# Patient Record
Sex: FEMALE | Race: BLACK OR AFRICAN AMERICAN
[De-identification: names, ages, dates, MRNs, and addresses within clinical notes are randomized per-mention and may not be internally consistent; named-entity substitution may affect disease eponyms.]

---

## 2016-07-30 VITALS
DIASTOLIC BLOOD PRESSURE: 73 MMHG | SYSTOLIC BLOOD PRESSURE: 148 MMHG | SYSTOLIC BLOOD PRESSURE: 148 MMHG | DIASTOLIC BLOOD PRESSURE: 73 MMHG

## 2019-08-06 ENCOUNTER — HOSPITAL ENCOUNTER (OUTPATIENT)
Dept: HOSPITAL 61 - MAMMO | Age: 64
Discharge: HOME | End: 2019-08-06
Attending: FAMILY MEDICINE
Payer: COMMERCIAL

## 2019-08-06 DIAGNOSIS — N63.20: ICD-10-CM

## 2019-08-06 DIAGNOSIS — Z12.31: Primary | ICD-10-CM

## 2019-08-06 DIAGNOSIS — N63.10: ICD-10-CM

## 2019-08-06 PROCEDURE — 77067 SCR MAMMO BI INCL CAD: CPT

## 2019-08-12 NOTE — RAD
DATE: 8/6/2019 1:58 PM



EXAM: DIGITAL SCREEN BILAT W/CAD



HISTORY:  routine screening evaluation.



COMPARISON: None available



Bilateral full field craniocaudal and mediolateral oblique images as well as

exaggerated CC images were obtained using digital technique.



This study was interpreted with the benefit of Computerized Aided Detection

(CAD).



Breast Density: FATTY  The Breast Parenchyma is primarily fatty replaced.

Breast parenchyma level density A.



FINDINGS:

Benign calcifications are present.  Multiple bilateral well-circumscribed

round breast masses are seen laterally.

 

Otherwise, no microcalcifications or architectural distortion is present.



The visualized axillae are unremarkable. 



IMPRESSION: Bilateral well-circumscribed breast masses, findings for which

additional imaging is advised. 



BI-RADS CATEGORY: 0 INCOMPLETE: NEEDS ADDITIONAL IMAGING EVALUATION AND/OR

PRIOR MAMMOGRAMS FOR COMPARISON.



RECOMMENDED FOLLOW-UP: ADD ADDITIONAL IMAGING The patient will be contacted to

submitted her prior imaging, performed at an outside institution, for the

purposes of comparison. If the patient is unable to submit the images in a

timely manner (1-2 weeks), then the recommendation would be for additional

imaging to be performed.



PQRS compliance statement: Patient information was entered into a reminder

system with a target due date for the next mammogram.



Mammography is a sensitive method for finding small breast cancers, but it

does not detect them all and is not a substitute for careful clinical

examination.  A negative mammogram does not negate a clinically suspicious

finding and should not result in delay in biopsying a clinically suspicious

abnormality.



"Our facility is accredited by the [employer] Mammography

Program."

## 2019-12-10 ENCOUNTER — HOSPITAL ENCOUNTER (OUTPATIENT)
Dept: HOSPITAL 61 - MAMMO | Age: 64
Discharge: HOME | End: 2019-12-10
Attending: NURSE PRACTITIONER
Payer: COMMERCIAL

## 2019-12-10 DIAGNOSIS — N63.11: ICD-10-CM

## 2019-12-10 DIAGNOSIS — R92.2: Primary | ICD-10-CM

## 2019-12-10 DIAGNOSIS — N63.21: ICD-10-CM

## 2019-12-10 PROCEDURE — 77066 DX MAMMO INCL CAD BI: CPT

## 2019-12-10 NOTE — RAD
DATE: 12/10/2019



EXAM: DIGITAL DIAGNOSTIC BILATERAL



HISTORY: Abnormal mammogram



COMPARISON: 8/6/2019 mammogram



This study was interpreted with the benefit of Computerized Aided Detection

(CAD).





Breast Density: SCATTERED The breast parenchyma shows scattered fibroglandular

densities. Breast parenchyma level B.





FINDINGS: Persistence of masses on spot compression is noted at the upper

outer aspect of the right and left breast. Fatty hilum is suggested as

respectively lymph nodes.  





IMPRESSION: Persistence of masses. These may represent intramammary lymph

nodes.







BI-RADS CATEGORY: 3 PROBABLY BENIGN FINDING(S)-SHORT INTERVAL FOLLOW-UP

SUGGESTED



RECOMMENDED FOLLOW-UP: 12M 12 MONTH FOLLOW-UP. Follow-up at the time of annual

mammographic evaluation is recommended to assess stability. Ultrasound may be

needed at that time.



PQRS compliance statement: Patient information was entered into a reminder

system with a target due date for the next mammogram.



Mammography is a sensitive method for finding small breast cancers, but it

does not detect them all and is not a substitute for careful clinical

examination.  A negative mammogram does not negate a clinically suspicious

finding and should not result in delay in biopsying a clinically suspicious

abnormality.



"Our facility is accredited by the [employer] Mammography

Program."

## 2021-07-01 ENCOUNTER — HOSPITAL ENCOUNTER (EMERGENCY)
Dept: HOSPITAL 61 - ER | Age: 66
LOS: 1 days | Discharge: HOME | End: 2021-07-02
Payer: MEDICARE

## 2021-07-01 VITALS — BODY MASS INDEX: 44.41 KG/M2 | HEIGHT: 68 IN | WEIGHT: 293 LBS

## 2021-07-01 DIAGNOSIS — J44.9: ICD-10-CM

## 2021-07-01 DIAGNOSIS — Z90.710: ICD-10-CM

## 2021-07-01 DIAGNOSIS — I13.0: ICD-10-CM

## 2021-07-01 DIAGNOSIS — Z95.0: ICD-10-CM

## 2021-07-01 DIAGNOSIS — N39.0: Primary | ICD-10-CM

## 2021-07-01 DIAGNOSIS — J06.9: ICD-10-CM

## 2021-07-01 DIAGNOSIS — N18.9: ICD-10-CM

## 2021-07-01 DIAGNOSIS — E11.22: ICD-10-CM

## 2021-07-01 DIAGNOSIS — I50.9: ICD-10-CM

## 2021-07-01 DIAGNOSIS — Z88.8: ICD-10-CM

## 2021-07-01 PROCEDURE — 85025 COMPLETE CBC W/AUTO DIFF WBC: CPT

## 2021-07-01 PROCEDURE — 71045 X-RAY EXAM CHEST 1 VIEW: CPT

## 2021-07-01 PROCEDURE — 99285 EMERGENCY DEPT VISIT HI MDM: CPT

## 2021-07-01 PROCEDURE — 83880 ASSAY OF NATRIURETIC PEPTIDE: CPT

## 2021-07-01 PROCEDURE — 96375 TX/PRO/DX INJ NEW DRUG ADDON: CPT

## 2021-07-01 PROCEDURE — 87086 URINE CULTURE/COLONY COUNT: CPT

## 2021-07-01 PROCEDURE — 96374 THER/PROPH/DIAG INJ IV PUSH: CPT

## 2021-07-01 PROCEDURE — 84484 ASSAY OF TROPONIN QUANT: CPT

## 2021-07-01 PROCEDURE — 83690 ASSAY OF LIPASE: CPT

## 2021-07-01 PROCEDURE — 36415 COLL VENOUS BLD VENIPUNCTURE: CPT

## 2021-07-01 PROCEDURE — 93005 ELECTROCARDIOGRAM TRACING: CPT

## 2021-07-01 PROCEDURE — 81001 URINALYSIS AUTO W/SCOPE: CPT

## 2021-07-01 PROCEDURE — 87077 CULTURE AEROBIC IDENTIFY: CPT

## 2021-07-01 PROCEDURE — 83735 ASSAY OF MAGNESIUM: CPT

## 2021-07-01 PROCEDURE — 80053 COMPREHEN METABOLIC PANEL: CPT

## 2021-07-02 VITALS — DIASTOLIC BLOOD PRESSURE: 79 MMHG | SYSTOLIC BLOOD PRESSURE: 169 MMHG

## 2021-07-02 LAB
ALBUMIN SERPL-MCNC: 2.7 G/DL (ref 3.4–5)
ALBUMIN/GLOB SERPL: 0.8 {RATIO} (ref 1–1.7)
ALP SERPL-CCNC: 76 U/L (ref 46–116)
ALT SERPL-CCNC: 17 U/L (ref 14–59)
ANION GAP SERPL CALC-SCNC: 8 MMOL/L (ref 6–14)
APTT PPP: YELLOW S
AST SERPL-CCNC: 13 U/L (ref 15–37)
BACTERIA #/AREA URNS HPF: (no result) /HPF
BASOPHILS # BLD AUTO: 0.1 X10^3/UL (ref 0–0.2)
BASOPHILS NFR BLD: 1 % (ref 0–3)
BILIRUB SERPL-MCNC: 0.3 MG/DL (ref 0.2–1)
BILIRUB UR QL STRIP: NEGATIVE
BUN SERPL-MCNC: 48 MG/DL (ref 7–20)
BUN/CREAT SERPL: 15 (ref 6–20)
CALCIUM SERPL-MCNC: 7.9 MG/DL (ref 8.5–10.1)
CHLORIDE SERPL-SCNC: 104 MMOL/L (ref 98–107)
CO2 SERPL-SCNC: 27 MMOL/L (ref 21–32)
CREAT SERPL-MCNC: 3.1 MG/DL (ref 0.6–1)
EOSINOPHIL NFR BLD: 0.3 X10^3/UL (ref 0–0.7)
EOSINOPHIL NFR BLD: 3 % (ref 0–3)
ERYTHROCYTE [DISTWIDTH] IN BLOOD BY AUTOMATED COUNT: 16.3 % (ref 11.5–14.5)
FIBRINOGEN PPP-MCNC: (no result) MG/DL
GFR SERPLBLD BASED ON 1.73 SQ M-ARVRAT: 18.1 ML/MIN
GLUCOSE SERPL-MCNC: 296 MG/DL (ref 70–99)
HCT VFR BLD CALC: 30.2 % (ref 36–47)
HGB BLD-MCNC: 9.7 G/DL (ref 12–15.5)
LIPASE: 106 U/L (ref 73–393)
LYMPHOCYTES # BLD: 2.9 X10^3/UL (ref 1–4.8)
LYMPHOCYTES NFR BLD AUTO: 30 % (ref 24–48)
MAGNESIUM SERPL-MCNC: 1.9 MG/DL (ref 1.8–2.4)
MCH RBC QN AUTO: 29 PG (ref 25–35)
MCHC RBC AUTO-ENTMCNC: 32 G/DL (ref 31–37)
MCV RBC AUTO: 92 FL (ref 79–100)
MONO #: 0.7 X10^3/UL (ref 0–1.1)
MONOCYTES NFR BLD: 7 % (ref 0–9)
NEUT #: 5.9 X10^3/UL (ref 1.8–7.7)
NEUTROPHILS NFR BLD AUTO: 60 % (ref 31–73)
NITRITE UR QL STRIP: NEGATIVE
PH UR STRIP: 6 [PH]
PLATELET # BLD AUTO: 249 X10^3/UL (ref 140–400)
POTASSIUM SERPL-SCNC: 5 MMOL/L (ref 3.5–5.1)
PROT SERPL-MCNC: 5.9 G/DL (ref 6.4–8.2)
PROT UR STRIP-MCNC: >=300 MG/DL
RBC # BLD AUTO: 3.28 X10^6/UL (ref 3.5–5.4)
RBC #/AREA URNS HPF: (no result) /HPF (ref 0–2)
SODIUM SERPL-SCNC: 139 MMOL/L (ref 136–145)
UROBILINOGEN UR-MCNC: 0.2 MG/DL
WBC # BLD AUTO: 9.8 X10^3/UL (ref 4–11)
WBC #/AREA URNS HPF: (no result) /HPF (ref 0–4)

## 2021-07-02 NOTE — EKG
[facility]

              [address] [place] [address]

Test Date:    2021               Test Time:    01:14:52

Pat Name:     ANNA HARRISON          Department:   

Patient ID:   [id number]           Room:          

Gender:       F                        Technician:   

:          1955               Requested By: KANCHAN LOZANO

Order Number: [id number]           Reading MD:     

                                 Measurements

Intervals                              Axis          

Rate:         64                       P:            0

OK:           160                      QRS:          -41

QRSD:         128                      T:            -80

QT:           428                                    

QTc:          446                                    

                           Interpretive Statements

SINUS RHYTHM

ABNORMAL LEFT AXIS DEVIATION

LEFT BUNDLE BRANCH BLOCK

ABNORMAL ECG

RI6.02

No previous ECG available for comparison

## 2021-07-02 NOTE — PHYS DOC
Past Medical History


Past Medical History:  CHF, COPD, Diabetes-Type II, High Cholesterol, Heart 

Disease, Hypertension


Additional Past Medical Histor:  sleep apnea, bradycardia, emphysema, toe 

amputation


Past Surgical History:  Hysterectomy, Pacemaker


Additional Past Surgical Histo:  RIGHT FOOT TOE AMPUTATION


Smoking Status:  Never Smoker


Alcohol Use:  None


Drug Use:  None





General Adult


EDM:


Chief Complaint:  MULTIPLE COMPLAINTS





HPI:


HPI:





Patient is a 66  year old female who presented to ER with multiple complaints.  

Patient says she has been having nonproductive cough, some nasal congestion, 

nasal drainage for about 2 weeks.  Patient has history of emphysema and COPD, 

she is not on oxygen at home.  Patient also complains of pain with urination for

1 week.  She denies any abdominal pain, no nausea vomiting, no chest pain, no 

fever.  Patient denies COVID-19 exposure.  Patient says he is not vaccinated for

COVID-19





Review of Systems:


Review of Systems:


Constitutional:   Denies fever or chills. []


Eyes:   Denies change in visual acuity. []


HENT:   Positive for nasal congestion, no sore throat


Respiratory:   Positive for cough, no trouble breathing


Cardiovascular:   Denies chest pain or edema. [] 


GI:   Denies abdominal pain, nausea, vomiting, bloody stools or diarrhea. [] 


: Positive for pain with urination and frequency.


Musculoskeletal:   Denies back pain or joint pain. [] 


Integument:   Denies rash. [] 


Neurologic:   Denies headache, focal weakness or sensory changes. [] 


Endocrine:   Denies polyuria or polydipsia. [] 


Lymphatic:  Denies swollen glands. [] 


Psychiatric:  Denies depression or anxiety. []





Heart Score:


C/O Chest Pain:  N/A


Risk Factors:


Risk Factors:  DM, Current or recent (<one month) smoker, HTN, HLP, family 

history of CAD, obesity.


Risk Scores:


Score 0 - 3:  2.5% MACE over next 6 weeks - Discharge Home


Score 4 - 6:  20.3% MACE over next 6 weeks - Admit for Clinical Observation


Score 7 - 10:  72.7% MACE over next 6 weeks - Early Invasive Strategies





Allergies:


Allergies:





Allergies








Coded Allergies Type Severity Reaction Last Updated Verified


 


  lisinopril Adverse Reaction Intermediate Nausea and Vomiting 4/27/15 Yes











Physical Exam:


PE:





Constitutional: Well developed, well nourished, no acute distress, non-toxic 

appearance. []


HENT: Normocephalic, atraumatic, bilateral external ears normal, oropharynx 

moist, no oral exudates, nose normal. []


Eyes: PERRLA, EOMI, conjunctiva normal, no discharge. [] 


Neck: Normal range of motion, no tenderness, supple, no stridor. [] 


Cardiovascular:Heart rate regular rhythm, no murmur []


Lungs & Thorax:  Bilateral breath sounds with mild expiratory wheezing  to 

auscultation .  No respiratory distress


Abdomen: Bowel sounds normal, soft, no tenderness, no masses, no pulsatile 

masses. [] 


Skin: Warm, dry, no erythema, no rash. [] 


Back: No tenderness, no CVA tenderness. [] 


Extremities: No tenderness, no cyanosis, no clubbing, ROM intact, no edema. [] 


Neurologic: Alert and oriented X 3, normal motor function, normal sensory funct

ion, no focal deficits noted. []


Psychologic: Affect normal, judgement normal, mood normal. []





Current Patient Data:


Vital Signs:





                                   Vital Signs








  Date Time  Temp Pulse Resp B/P (MAP) Pulse Ox O2 Delivery O2 Flow Rate FiO2


 


21 00:25 98.9 67 18 183/78 (113) 99 Room Air  





 98.9       











EKG:


EKG:


EKG was done at 1:20 AM, heart rate of 64 bpm, sinus rhythm, left bundle branch 

block, left axis deviation no ST segment elevation.





Radiology/Procedures:


Radiology/Procedures:


[][facility]


                    [address]  [place], [place] [address] [phone]


                                        


                                 IMAGING REPORT





                                     Signed





PATIENT: ANNA HARRISON MACCOUNT: [id number]     MRN#: [id number]


: 1955           LOCATION: ER              AGE: 66


SEX: F                    EXAM DT: 21         ACCESSION#: [id number]


STATUS: REG ER            ORD. PHYSICIAN: KANCHAN LOZANO DO


REASON: cough for two weeks


PROCEDURE: PORTABLE CHEST 1V





XR CHEST 1V 





INDICATION: cough for two weeks 





COMPARISON STUDY: None.





FINDINGS:





Life Support Devices: Left pectoral transvenous dual-chamber ICD/pacemaker.





Lungs: Normal lung volume. No focal airspace disease. Normal pulmonary 

vasculature.





Pleura: No pleural effusion or pneumothorax.





Heart and Mediastinum: Normal cardiomediastinal silhouette and great vessels. 





IMPRESSION:  No acute cardiopulmonary process.





Electronically signed by: Trent Gonzales MD (2021 2:00 AM) [facility]














DICTATED and SIGNED BY:     TRENT GONZALES MD


DATE:     21 [id number]





Course & Med Decision Making:


Course & Med Decision Making


Pertinent Labs and Imaging studies reviewed. (See chart for details)





Patient is a 66-year-old female who present to ER due to upper respiratory 

symptoms and painful with urination.  Her lab work showed she had evidence of 

urinary tract infection.  She had chronic renal insufficiency, her creatinine 

level is elevated, patient says she is scheduled to see her family physician for

 reevaluation sometime next week.  Patient does not want to stay in the 

hospital.  Patient wanted to go home with a prescription to treat her UTI.  

Patient states she will call her family physician on Monday to see him and then 

she will ask for referral to see a kidney doctor.  Patient's daughter was here 

with her.





Dragon Disclaimer:


Dragon Disclaimer:


This electronic medical record was generated, in whole or in part, using a voice

 recognition dictation system.





Departure


Departure


Impression:  


   Primary Impression:  


   Urinary tract infection


   Additional Impressions:  


   Upper respiratory infection


   Chronic renal disease


Disposition:   HOME / SELF CARE / HOMELESS


Condition:  STABLE


Referrals:  


LYDIA WAGNER MD (PCP)


Please follow up with your doctor on Monday for reevaluation.  You will need a 

referral from your doctor to a kidney specialist about your kidney problem next 

week.


Patient Instructions:  Chronic Renal Insufficiency, Upper Respiratory Infection,

 Adult, Urinary Tract Infection





Additional Instructions:  


Thank you for visiting our Emergency Department. We appreciate you trusting us 

with your care. If any additional problems come up don't hesitate to return to 

visit us. Please follow up with your primary care provider so they can plan 

additional care if needed and know about the problem that you had. If symptoms 

worsen come back to the Emergency Department. Any concerning symptoms that start

 such as chest pain, shortness of air, weakness or numbness on one side of the 

body, running high fevers or any other concerning symptoms return to the ER.


Scripts


Cephalexin (CEPHALEXIN) 500 Mg Tablet


1 TAB PO QID for 7 Days, #28 TAB


   Prov: KANCHAN LOZANO DO         21











KANCHAN LOZANO DO                 2021 01:47

## 2021-07-02 NOTE — RAD
XR CHEST 1V 



INDICATION: cough for two weeks 



COMPARISON STUDY: None.



FINDINGS:



Life Support Devices: Left pectoral transvenous dual-chamber ICD/pacemaker.



Lungs: Normal lung volume. No focal airspace disease. Normal pulmonary vasculature.



Pleura: No pleural effusion or pneumothorax.



Heart and Mediastinum: Normal cardiomediastinal silhouette and great vessels. 



IMPRESSION:  No acute cardiopulmonary process.



Electronically signed by: Obed Gonzales MD (7/2/2021 2:00 AM) [facility]